# Patient Record
Sex: FEMALE | Race: OTHER
[De-identification: names, ages, dates, MRNs, and addresses within clinical notes are randomized per-mention and may not be internally consistent; named-entity substitution may affect disease eponyms.]

---

## 2020-02-11 ENCOUNTER — HOSPITAL ENCOUNTER (OUTPATIENT)
Dept: HOSPITAL 93 - CIR.AMB | Age: 40
Discharge: HOME | End: 2020-02-11
Attending: GENERAL PRACTICE
Payer: COMMERCIAL

## 2020-02-11 VITALS — WEIGHT: 131 LBS | BODY MASS INDEX: 24.11 KG/M2 | HEIGHT: 62 IN

## 2020-02-11 DIAGNOSIS — O02.1: Primary | ICD-10-CM

## 2020-02-11 NOTE — NUR
PACIENTE ALERTA Y ORIENTADA X3 CON 8 SEMANAS DE EMBARAZO REFIERE SANGRADO
VAGINAL DESDE KAYODE EN LA NOCHE, SE UBICA EN AREA DE OBSERVACION PARA EVALUACION
MEDICA.

## 2020-02-11 NOTE — NUR
PACIENTE ALERTA Y ORIENTADA POR KAILA ESFERAS. MS. [place] ORIENTA A PACIENTE
SOBRE PROCEDIMIENTO Y TX, REFIERE ENTENDER. EXTRAE MUESTRAS DE LABORATORIO CON
MEDIDAS ASEPTICAS Y ADMINISTRA MEDICAMENTOS CHRIS ORDEN MEDICA.

## 2020-02-11 NOTE — NUR
PTE CON ORDEN DE ADMISION DEL DR OATES BAJO LOS SERVICIOS DE DR JONES. SE
OPRINETA A PTE SOBRE ADMISION, TX Y PROCEDIMIENTOS A REALIZAR, LO CUAL REFIER
EENTENDER. PTE PENDIENTE A SER LLAMADA DE OR PARA PROVEER VESTIMENTA Y
ADMINISTRAR MEDICAMENTO.